# Patient Record
Sex: MALE | Race: WHITE
[De-identification: names, ages, dates, MRNs, and addresses within clinical notes are randomized per-mention and may not be internally consistent; named-entity substitution may affect disease eponyms.]

---

## 2022-07-08 ENCOUNTER — HOSPITAL ENCOUNTER (INPATIENT)
Dept: HOSPITAL 95 - ER | Age: 57
LOS: 3 days | Discharge: HOME | DRG: 871 | End: 2022-07-11
Attending: HOSPITALIST | Admitting: HOSPITALIST
Payer: MEDICARE

## 2022-07-08 VITALS — WEIGHT: 238.1 LBS | HEIGHT: 67 IN | BODY MASS INDEX: 37.37 KG/M2

## 2022-07-08 DIAGNOSIS — Z89.421: ICD-10-CM

## 2022-07-08 DIAGNOSIS — I25.10: ICD-10-CM

## 2022-07-08 DIAGNOSIS — Z99.81: ICD-10-CM

## 2022-07-08 DIAGNOSIS — Z88.8: ICD-10-CM

## 2022-07-08 DIAGNOSIS — F17.290: ICD-10-CM

## 2022-07-08 DIAGNOSIS — Z88.5: ICD-10-CM

## 2022-07-08 DIAGNOSIS — I25.2: ICD-10-CM

## 2022-07-08 DIAGNOSIS — R65.20: ICD-10-CM

## 2022-07-08 DIAGNOSIS — E66.01: ICD-10-CM

## 2022-07-08 DIAGNOSIS — D64.9: ICD-10-CM

## 2022-07-08 DIAGNOSIS — J96.01: ICD-10-CM

## 2022-07-08 DIAGNOSIS — J18.9: ICD-10-CM

## 2022-07-08 DIAGNOSIS — G47.33: ICD-10-CM

## 2022-07-08 DIAGNOSIS — I50.33: ICD-10-CM

## 2022-07-08 DIAGNOSIS — Z79.899: ICD-10-CM

## 2022-07-08 DIAGNOSIS — Z79.4: ICD-10-CM

## 2022-07-08 DIAGNOSIS — N40.0: ICD-10-CM

## 2022-07-08 DIAGNOSIS — G47.00: ICD-10-CM

## 2022-07-08 DIAGNOSIS — I11.0: ICD-10-CM

## 2022-07-08 DIAGNOSIS — E11.9: ICD-10-CM

## 2022-07-08 DIAGNOSIS — I21.A1: ICD-10-CM

## 2022-07-08 DIAGNOSIS — J44.0: ICD-10-CM

## 2022-07-08 DIAGNOSIS — Z86.16: ICD-10-CM

## 2022-07-08 DIAGNOSIS — Z95.1: ICD-10-CM

## 2022-07-08 DIAGNOSIS — N17.9: ICD-10-CM

## 2022-07-08 DIAGNOSIS — A41.9: Primary | ICD-10-CM

## 2022-07-08 DIAGNOSIS — L53.9: ICD-10-CM

## 2022-07-08 DIAGNOSIS — R77.8: ICD-10-CM

## 2022-07-08 LAB
ALBUMIN SERPL BCP-MCNC: 1.6 G/DL (ref 3.4–5)
ALBUMIN/GLOB SERPL: 0.3 {RATIO} (ref 0.8–1.8)
ALT SERPL W P-5'-P-CCNC: 19 U/L (ref 12–78)
ANION GAP SERPL CALCULATED.4IONS-SCNC: 9 MMOL/L (ref 6–16)
AST SERPL W P-5'-P-CCNC: 27 U/L (ref 12–37)
BASOPHILS # BLD AUTO: 0.03 K/MM3 (ref 0–0.23)
BASOPHILS NFR BLD AUTO: 0 % (ref 0–2)
BILIRUB SERPL-MCNC: 0.5 MG/DL (ref 0.1–1)
BUN SERPL-MCNC: 24 MG/DL (ref 8–24)
CALCIUM SERPL-MCNC: 8.4 MG/DL (ref 8.5–10.1)
CHLORIDE SERPL-SCNC: 101 MMOL/L (ref 98–108)
CO2 SERPL-SCNC: 25 MMOL/L (ref 21–32)
CREAT SERPL-MCNC: 1.72 MG/DL (ref 0.6–1.2)
DEPRECATED RDW RBC AUTO: 48.7 FL (ref 35.1–46.3)
EOSINOPHIL # BLD AUTO: 0 K/MM3 (ref 0–0.68)
EOSINOPHIL NFR BLD AUTO: 0 % (ref 0–6)
ERYTHROCYTE [DISTWIDTH] IN BLOOD BY AUTOMATED COUNT: 14.8 % (ref 11.7–14.2)
FLUAV RNA SPEC QL NAA+PROBE: NEGATIVE
FLUBV RNA SPEC QL NAA+PROBE: NEGATIVE
GLOBULIN SER CALC-MCNC: 4.9 G/DL (ref 2.2–4)
GLUCOSE SERPL-MCNC: 240 MG/DL (ref 70–99)
HCT VFR BLD AUTO: 34.1 % (ref 37–53)
HGB BLD-MCNC: 11.2 G/DL (ref 13.5–17.5)
IMM GRANULOCYTES # BLD AUTO: 0.31 K/MM3 (ref 0–0.1)
IMM GRANULOCYTES NFR BLD AUTO: 2 % (ref 0–1)
LYMPHOCYTES # BLD AUTO: 0.54 K/MM3 (ref 0.84–5.2)
LYMPHOCYTES NFR BLD AUTO: 3 % (ref 21–46)
MCHC RBC AUTO-ENTMCNC: 32.8 G/DL (ref 31.5–36.5)
MCV RBC AUTO: 90 FL (ref 80–100)
MONOCYTES # BLD AUTO: 0.31 K/MM3 (ref 0.16–1.47)
MONOCYTES NFR BLD AUTO: 2 % (ref 4–13)
NEUTROPHILS # BLD AUTO: 17.1 K/MM3 (ref 1.96–9.15)
NEUTROPHILS NFR BLD AUTO: 93 % (ref 41–73)
NRBC # BLD AUTO: 0 K/MM3 (ref 0–0.02)
NRBC BLD AUTO-RTO: 0 /100 WBC (ref 0–0.2)
PLATELET # BLD AUTO: 168 K/MM3 (ref 150–400)
POTASSIUM SERPL-SCNC: 3.7 MMOL/L (ref 3.5–5.5)
PROT SERPL-MCNC: 6.5 G/DL (ref 6.4–8.2)
RSV RNA SPEC QL NAA+PROBE: NEGATIVE
SARS-COV-2 RNA RESP QL NAA+PROBE: POSITIVE
SODIUM SERPL-SCNC: 135 MMOL/L (ref 136–145)

## 2022-07-08 PROCEDURE — P9046 ALBUMIN (HUMAN), 25%, 20 ML: HCPCS

## 2022-07-08 PROCEDURE — A9270 NON-COVERED ITEM OR SERVICE: HCPCS

## 2022-07-08 NOTE — NUR
PCU ADMIT / SHIFT SUMMARY
 
PT BROUGHT TO PCU-13 BY TEETEE FROM ER @ APPROX 1500. PT LETHARGIC, SLID OVER
FROM [facility] TO PCU BED BY 4 STAFF MEMBERS. PT REQUIRING FREQUENT REAWAKENING
TO COMPLETE ADMIT QUESTIONS/ASSESSMENT. PT MORE ALERT AFTER SLEEPING FOR
PERIOD. VSS. MONITOR SHOWING SR, HR 80's. SPO2 > 92% ON 2L NC WHICH IS PT's
REPORTED HOME BASELINE. PT DOES HOWEVER REPORT BEING HOMELESS AT THIS TIME,
STAYING [facility] TH[facility]." PT W/ BLE REDNESS.
ULCER NOTED TO R HEEL, SEE WOUND PHOTO IN CHART. WOUND CLEANSED & DRESSED. PT
UP TO BSC, UNSTEADY WHEN STANDING. PT REPORTING "I STILL HAVEN'T GOTTEN MY
BALANCE AFTER HAVING MY TOE AMPUTATED." PT W/ LOOSE BROWN/YELLOW STOOL X2,
MIXED W/ URINE IN COMMODE.

## 2022-07-09 LAB
ALBUMIN SERPL BCP-MCNC: 1.7 G/DL (ref 3.4–5)
ALBUMIN/GLOB SERPL: 0.4 {RATIO} (ref 0.8–1.8)
ALT SERPL W P-5'-P-CCNC: 15 U/L (ref 12–78)
ANION GAP SERPL CALCULATED.4IONS-SCNC: 6 MMOL/L (ref 6–16)
AST SERPL W P-5'-P-CCNC: 14 U/L (ref 12–37)
BASOPHILS # BLD AUTO: 0.02 K/MM3 (ref 0–0.23)
BASOPHILS NFR BLD AUTO: 0 % (ref 0–2)
BILIRUB SERPL-MCNC: 0.4 MG/DL (ref 0.1–1)
BUN SERPL-MCNC: 36 MG/DL (ref 8–24)
CALCIUM SERPL-MCNC: 8.1 MG/DL (ref 8.5–10.1)
CHLORIDE SERPL-SCNC: 99 MMOL/L (ref 98–108)
CO2 SERPL-SCNC: 29 MMOL/L (ref 21–32)
CREAT SERPL-MCNC: 2.32 MG/DL (ref 0.6–1.2)
DEPRECATED RDW RBC AUTO: 50.8 FL (ref 35.1–46.3)
EOSINOPHIL # BLD AUTO: 0 K/MM3 (ref 0–0.68)
EOSINOPHIL NFR BLD AUTO: 0 % (ref 0–6)
ERYTHROCYTE [DISTWIDTH] IN BLOOD BY AUTOMATED COUNT: 15.1 % (ref 11.7–14.2)
GLOBULIN SER CALC-MCNC: 4 G/DL (ref 2.2–4)
GLUCOSE SERPL-MCNC: 299 MG/DL (ref 70–99)
HCT VFR BLD AUTO: 27.9 % (ref 37–53)
HCT VFR BLD AUTO: 36.5 % (ref 37–53)
HGB BLD-MCNC: 11.6 G/DL (ref 13.5–17.5)
HGB BLD-MCNC: 8.8 G/DL (ref 13.5–17.5)
IMM GRANULOCYTES # BLD AUTO: 0.49 K/MM3 (ref 0–0.1)
IMM GRANULOCYTES NFR BLD AUTO: 3 % (ref 0–1)
LYMPHOCYTES # BLD AUTO: 0.8 K/MM3 (ref 0.84–5.2)
LYMPHOCYTES NFR BLD AUTO: 5 % (ref 21–46)
MCHC RBC AUTO-ENTMCNC: 31.5 G/DL (ref 31.5–36.5)
MCV RBC AUTO: 93 FL (ref 80–100)
MONOCYTES # BLD AUTO: 0.76 K/MM3 (ref 0.16–1.47)
MONOCYTES NFR BLD AUTO: 4 % (ref 4–13)
NEUTROPHILS # BLD AUTO: 15.42 K/MM3 (ref 1.96–9.15)
NEUTROPHILS NFR BLD AUTO: 88 % (ref 41–73)
NRBC # BLD AUTO: 0 K/MM3 (ref 0–0.02)
NRBC BLD AUTO-RTO: 0 /100 WBC (ref 0–0.2)
PLATELET # BLD AUTO: 140 K/MM3 (ref 150–400)
POTASSIUM SERPL-SCNC: 3.7 MMOL/L (ref 3.5–5.5)
PROT SERPL-MCNC: 5.7 G/DL (ref 6.4–8.2)
SODIUM SERPL-SCNC: 134 MMOL/L (ref 136–145)

## 2022-07-09 NOTE — NUR
SHIFT SUMMARY
PT A/O X4; PLEASANT AND COOPERATIVE WITH CARE. DENIES CP, DYSPNEA, OR PAIN. PT
ON LASIX AND VOIDING OFTEN. STAND BY ASSIST TO THE BSC. VSS. BED IN LOWEST
POSITION AND CALL LIGHT IN REACH.

## 2022-07-09 NOTE — NUR
SHIFT SUMMARY
 
NO ACUTE CHANGES TO REPORT OVERNIGHT, PT HAS SLEPT T/O SHIFT. HE DENIES
OVERNIGHT. CPAP SET UP FOR PT OVERNIGHT WITH 5L BLEED IN, PT IS TOLERATING
AND IS MAINTAINING SATS ABOVE 95%. IV ANTIBIOTICS CONTINUED PER ORDERS. NO
CHANGES ON TELE. BED IN LOWEST POSITION, CALL LIGHT WITHIN REACH.

## 2022-07-10 LAB
ANION GAP SERPL CALCULATED.4IONS-SCNC: 7 MMOL/L (ref 6–16)
BASOPHILS # BLD AUTO: 0.02 K/MM3 (ref 0–0.23)
BASOPHILS NFR BLD AUTO: 0 % (ref 0–2)
BUN SERPL-MCNC: 46 MG/DL (ref 8–24)
CALCIUM SERPL-MCNC: 8.2 MG/DL (ref 8.5–10.1)
CHLORIDE SERPL-SCNC: 100 MMOL/L (ref 98–108)
CO2 SERPL-SCNC: 28 MMOL/L (ref 21–32)
CREAT SERPL-MCNC: 1.9 MG/DL (ref 0.6–1.2)
DEPRECATED RDW RBC AUTO: 49.2 FL (ref 35.1–46.3)
EOSINOPHIL # BLD AUTO: 0.03 K/MM3 (ref 0–0.68)
EOSINOPHIL NFR BLD AUTO: 0 % (ref 0–6)
ERYTHROCYTE [DISTWIDTH] IN BLOOD BY AUTOMATED COUNT: 14.8 % (ref 11.7–14.2)
GLUCOSE SERPL-MCNC: 192 MG/DL (ref 70–99)
HCT VFR BLD AUTO: 31.4 % (ref 37–53)
HGB BLD-MCNC: 10.2 G/DL (ref 13.5–17.5)
IMM GRANULOCYTES # BLD AUTO: 0.04 K/MM3 (ref 0–0.1)
IMM GRANULOCYTES NFR BLD AUTO: 0 % (ref 0–1)
LYMPHOCYTES # BLD AUTO: 1.07 K/MM3 (ref 0.84–5.2)
LYMPHOCYTES NFR BLD AUTO: 12 % (ref 21–46)
MCHC RBC AUTO-ENTMCNC: 32.5 G/DL (ref 31.5–36.5)
MCV RBC AUTO: 91 FL (ref 80–100)
MONOCYTES # BLD AUTO: 0.52 K/MM3 (ref 0.16–1.47)
MONOCYTES NFR BLD AUTO: 6 % (ref 4–13)
NEUTROPHILS # BLD AUTO: 7.6 K/MM3 (ref 1.96–9.15)
NEUTROPHILS NFR BLD AUTO: 82 % (ref 41–73)
NRBC # BLD AUTO: 0 K/MM3 (ref 0–0.02)
NRBC BLD AUTO-RTO: 0 /100 WBC (ref 0–0.2)
PLATELET # BLD AUTO: 128 K/MM3 (ref 150–400)
POTASSIUM SERPL-SCNC: 3.4 MMOL/L (ref 3.5–5.5)
SODIUM SERPL-SCNC: 135 MMOL/L (ref 136–145)

## 2022-07-10 NOTE — NUR
PT HAS BEEN STABLE THIS SHIFT. BLOOD SUGARS STABLE WITH COVERAGE AS ORDERED
WITH MEALS. PT HAS BEEN ON RA THROUGHOUT DAY WHILE AWAKE. PT PLACED ON 1-2L
WITH SLEEP AS HE DESATS INTO 70'S WITH SLEEP. CPAP NONCOMPLIANT. PT VOIDING
WELL WITH BSC. INDEP TO TRANSFER BUT SBA TO AMBULATE. HEEL ULCER REDRESSED
AFTER SHOWER THIS AFTERNOON, PHOTO ON CHART. TELE SR WITH BBB. PT REFUSING
HEPARIN AND PAS. PLAN FOR DC BACK TO [facility] WHEN APPRORIATE.

## 2022-07-10 NOTE — NUR
SUMMARY
PT WITH NO ACUTE CHANGES TONIGHT.ABLE TO INDEPENDANTLY GET TO BSC AND FOR BRP.
PT USES CANE BASELINE.REPORTS MINIMAL SOB WITH ACTIVITY.

## 2022-07-10 NOTE — NUR
OXYGEN:
ABLE TO WEAN PT TO RA WHEN AWAKE AND SITTING UP. PT LAYING DOWN TO SLEEP AND
SATS DIP DOWN INTO THE 70'S. PLACED BACK ON 3L. WILL CONTINUE TO WEAN AS ABLE.

## 2022-07-11 LAB
ANION GAP SERPL CALCULATED.4IONS-SCNC: 4 MMOL/L (ref 6–16)
BASOPHILS # BLD AUTO: 0.03 K/MM3 (ref 0–0.23)
BASOPHILS NFR BLD AUTO: 1 % (ref 0–2)
BUN SERPL-MCNC: 44 MG/DL (ref 8–24)
CALCIUM SERPL-MCNC: 8.4 MG/DL (ref 8.5–10.1)
CHLORIDE SERPL-SCNC: 108 MMOL/L (ref 98–108)
CO2 SERPL-SCNC: 32 MMOL/L (ref 21–32)
CREAT SERPL-MCNC: 1.45 MG/DL (ref 0.6–1.2)
DEPRECATED RDW RBC AUTO: 51.5 FL (ref 35.1–46.3)
EOSINOPHIL # BLD AUTO: 0.07 K/MM3 (ref 0–0.68)
EOSINOPHIL NFR BLD AUTO: 2 % (ref 0–6)
ERYTHROCYTE [DISTWIDTH] IN BLOOD BY AUTOMATED COUNT: 15 % (ref 11.7–14.2)
GLUCOSE SERPL-MCNC: 163 MG/DL (ref 70–99)
HCT VFR BLD AUTO: 32.4 % (ref 37–53)
HGB BLD-MCNC: 10 G/DL (ref 13.5–17.5)
IMM GRANULOCYTES # BLD AUTO: 0.01 K/MM3 (ref 0–0.1)
IMM GRANULOCYTES NFR BLD AUTO: 0 % (ref 0–1)
LYMPHOCYTES # BLD AUTO: 1.15 K/MM3 (ref 0.84–5.2)
LYMPHOCYTES NFR BLD AUTO: 24 % (ref 21–46)
MCHC RBC AUTO-ENTMCNC: 30.9 G/DL (ref 31.5–36.5)
MCV RBC AUTO: 94 FL (ref 80–100)
MONOCYTES # BLD AUTO: 0.41 K/MM3 (ref 0.16–1.47)
MONOCYTES NFR BLD AUTO: 9 % (ref 4–13)
NEUTROPHILS # BLD AUTO: 3.12 K/MM3 (ref 1.96–9.15)
NEUTROPHILS NFR BLD AUTO: 65 % (ref 41–73)
NRBC # BLD AUTO: 0 K/MM3 (ref 0–0.02)
NRBC BLD AUTO-RTO: 0 /100 WBC (ref 0–0.2)
PLATELET # BLD AUTO: 128 K/MM3 (ref 150–400)
POTASSIUM SERPL-SCNC: 3.5 MMOL/L (ref 3.5–5.5)
SODIUM SERPL-SCNC: 144 MMOL/L (ref 136–145)

## 2022-07-11 NOTE — NUR
DISCHARGE EDUCATION
DISCUSSED INSTRUCTIONS. PT FEELS COMFORTABLE w/ INFO. MEDS CALLED TO [facility]. WAITING FOR O2 DELIVERY.

## 2022-07-11 NOTE — NUR
NIGHT SHIFT SUMMARY
PT IS ALERT AND COMMUNICATING APPROPRIATELY. PT MAINTAINED O2 SATS >90% ON 2L
NC EXCEPT FOR TWO EPISODES WHERE THE PT'S O2 SATS DROPPED RAPIDLY INTO THE
70'S REQUIRING INCREASED O2 TO CATCH UP. BP HAND HR WNL AND STABLE THIS SHIFT.
PT AFEBRILE. PT WAS ABLE TO SLEEP COMFORTABLY FOR MOST OF THE SHIFT WEARING
HIS CPAP W 3L BLEED-IN. WILL REPORT TO ONCOMING RN.

## 2022-07-26 ENCOUNTER — HOSPITAL ENCOUNTER (EMERGENCY)
Dept: HOSPITAL 95 - ER | Age: 57
Discharge: HOME | End: 2022-07-26
Payer: MEDICARE

## 2022-07-26 VITALS — BODY MASS INDEX: 33.34 KG/M2 | WEIGHT: 220 LBS | HEIGHT: 68 IN

## 2022-07-26 DIAGNOSIS — I25.2: ICD-10-CM

## 2022-07-26 DIAGNOSIS — Z59.02: ICD-10-CM

## 2022-07-26 DIAGNOSIS — Z79.899: ICD-10-CM

## 2022-07-26 DIAGNOSIS — J44.9: ICD-10-CM

## 2022-07-26 DIAGNOSIS — Z91.14: ICD-10-CM

## 2022-07-26 DIAGNOSIS — T67.5XXA: Primary | ICD-10-CM

## 2022-07-26 DIAGNOSIS — I50.9: ICD-10-CM
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
## 2022-07-26 DIAGNOSIS — Z88.5: ICD-10-CM

## 2022-07-26 DIAGNOSIS — Z88.6: ICD-10-CM

## 2022-07-26 DIAGNOSIS — E11.9: ICD-10-CM

## 2022-07-26 LAB
ALBUMIN SERPL BCP-MCNC: 1.4 G/DL (ref 3.4–5)
ALBUMIN/GLOB SERPL: 0.3 {RATIO} (ref 0.8–1.8)
ALT SERPL W P-5'-P-CCNC: 14 U/L (ref 12–78)
ANION GAP SERPL CALCULATED.4IONS-SCNC: 4 MMOL/L (ref 6–16)
AST SERPL W P-5'-P-CCNC: 15 U/L (ref 12–37)
BASOPHILS # BLD AUTO: 0.05 K/MM3 (ref 0–0.23)
BASOPHILS NFR BLD AUTO: 1 % (ref 0–2)
BILIRUB SERPL-MCNC: 0.2 MG/DL (ref 0.1–1)
BUN SERPL-MCNC: 12 MG/DL (ref 8–24)
CALCIUM SERPL-MCNC: 8.2 MG/DL (ref 8.5–10.1)
CHLORIDE SERPL-SCNC: 108 MMOL/L (ref 98–108)
CO2 SERPL-SCNC: 31 MMOL/L (ref 21–32)
CREAT SERPL-MCNC: 0.97 MG/DL (ref 0.6–1.2)
DEPRECATED RDW RBC AUTO: 45.4 FL (ref 35.1–46.3)
EOSINOPHIL # BLD AUTO: 0.09 K/MM3 (ref 0–0.68)
EOSINOPHIL NFR BLD AUTO: 1 % (ref 0–6)
ERYTHROCYTE [DISTWIDTH] IN BLOOD BY AUTOMATED COUNT: 13.8 % (ref 11.7–14.2)
GLOBULIN SER CALC-MCNC: 4.9 G/DL (ref 2.2–4)
GLUCOSE SERPL-MCNC: 202 MG/DL (ref 70–99)
HCT VFR BLD AUTO: 34.1 % (ref 37–53)
HGB BLD-MCNC: 11 G/DL (ref 13.5–17.5)
IMM GRANULOCYTES # BLD AUTO: 0.01 K/MM3 (ref 0–0.1)
IMM GRANULOCYTES NFR BLD AUTO: 0 % (ref 0–1)
LYMPHOCYTES # BLD AUTO: 1.59 K/MM3 (ref 0.84–5.2)
LYMPHOCYTES NFR BLD AUTO: 21 % (ref 21–46)
MAGNESIUM SERPL-MCNC: 2 MG/DL (ref 1.6–2.4)
MCHC RBC AUTO-ENTMCNC: 32.3 G/DL (ref 31.5–36.5)
MCV RBC AUTO: 89 FL (ref 80–100)
MONOCYTES # BLD AUTO: 0.56 K/MM3 (ref 0.16–1.47)
MONOCYTES NFR BLD AUTO: 7 % (ref 4–13)
NEUTROPHILS # BLD AUTO: 5.39 K/MM3 (ref 1.96–9.15)
NEUTROPHILS NFR BLD AUTO: 70 % (ref 41–73)
NRBC # BLD AUTO: 0 K/MM3 (ref 0–0.02)
NRBC BLD AUTO-RTO: 0 /100 WBC (ref 0–0.2)
PLATELET # BLD AUTO: 252 K/MM3 (ref 150–400)
POTASSIUM SERPL-SCNC: 3.3 MMOL/L (ref 3.5–5.5)
PROT SERPL-MCNC: 6.3 G/DL (ref 6.4–8.2)
SODIUM SERPL-SCNC: 143 MMOL/L (ref 136–145)

## 2022-07-26 SDOH — ECONOMIC STABILITY - HOUSING INSECURITY: UNSHELTERED HOMELESSNESS: Z59.02

## 2022-10-08 ENCOUNTER — HOSPITAL ENCOUNTER (INPATIENT)
Dept: HOSPITAL 95 - ER | Age: 57
LOS: 5 days | Discharge: HOME | DRG: 872 | End: 2022-10-13
Attending: HOSPITALIST | Admitting: HOSPITALIST
Payer: MEDICARE

## 2022-10-08 VITALS — BODY MASS INDEX: 35.61 KG/M2 | HEIGHT: 67 IN | WEIGHT: 226.86 LBS

## 2022-10-08 DIAGNOSIS — Z79.82: ICD-10-CM

## 2022-10-08 DIAGNOSIS — Z88.5: ICD-10-CM

## 2022-10-08 DIAGNOSIS — E11.40: ICD-10-CM

## 2022-10-08 DIAGNOSIS — Z79.4: ICD-10-CM

## 2022-10-08 DIAGNOSIS — I50.32: ICD-10-CM

## 2022-10-08 DIAGNOSIS — E66.9: ICD-10-CM

## 2022-10-08 DIAGNOSIS — I25.10: ICD-10-CM

## 2022-10-08 DIAGNOSIS — Z88.6: ICD-10-CM

## 2022-10-08 DIAGNOSIS — N40.0: ICD-10-CM

## 2022-10-08 DIAGNOSIS — Z95.1: ICD-10-CM

## 2022-10-08 DIAGNOSIS — I11.0: ICD-10-CM

## 2022-10-08 DIAGNOSIS — Z88.8: ICD-10-CM

## 2022-10-08 DIAGNOSIS — Z89.421: ICD-10-CM

## 2022-10-08 DIAGNOSIS — L03.115: ICD-10-CM

## 2022-10-08 DIAGNOSIS — L97.419: ICD-10-CM

## 2022-10-08 DIAGNOSIS — Z79.899: ICD-10-CM

## 2022-10-08 DIAGNOSIS — G47.33: ICD-10-CM

## 2022-10-08 DIAGNOSIS — J44.9: ICD-10-CM

## 2022-10-08 DIAGNOSIS — A41.9: Primary | ICD-10-CM

## 2022-10-08 LAB
ALBUMIN SERPL BCP-MCNC: 1.7 G/DL (ref 3.4–5)
ALBUMIN/GLOB SERPL: 0.3 {RATIO} (ref 0.8–1.8)
ALT SERPL W P-5'-P-CCNC: 23 U/L (ref 12–78)
ANION GAP SERPL CALCULATED.4IONS-SCNC: 4 MMOL/L (ref 6–16)
AST SERPL W P-5'-P-CCNC: 33 U/L (ref 12–37)
BASOPHILS # BLD AUTO: 0.04 K/MM3 (ref 0–0.23)
BASOPHILS NFR BLD AUTO: 0 % (ref 0–2)
BILIRUB SERPL-MCNC: 0.6 MG/DL (ref 0.1–1)
BUN SERPL-MCNC: 26 MG/DL (ref 8–24)
CALCIUM SERPL-MCNC: 8.3 MG/DL (ref 8.5–10.1)
CHLORIDE SERPL-SCNC: 100 MMOL/L (ref 98–108)
CO2 SERPL-SCNC: 29 MMOL/L (ref 21–32)
CREAT SERPL-MCNC: 1.42 MG/DL (ref 0.6–1.2)
DEPRECATED RDW RBC AUTO: 50.4 FL (ref 35.1–46.3)
EOSINOPHIL # BLD AUTO: 0.01 K/MM3 (ref 0–0.68)
EOSINOPHIL NFR BLD AUTO: 0 % (ref 0–6)
ERYTHROCYTE [DISTWIDTH] IN BLOOD BY AUTOMATED COUNT: 15.5 % (ref 11.7–14.2)
GLOBULIN SER CALC-MCNC: 5.7 G/DL (ref 2.2–4)
GLUCOSE SERPL-MCNC: 147 MG/DL (ref 70–99)
HCT VFR BLD AUTO: 36 % (ref 37–53)
HGB BLD-MCNC: 11.6 G/DL (ref 13.5–17.5)
IMM GRANULOCYTES # BLD AUTO: 0.04 K/MM3 (ref 0–0.1)
IMM GRANULOCYTES NFR BLD AUTO: 0 % (ref 0–1)
LYMPHOCYTES # BLD AUTO: 0.77 K/MM3 (ref 0.84–5.2)
LYMPHOCYTES NFR BLD AUTO: 7 % (ref 21–46)
MCHC RBC AUTO-ENTMCNC: 32.2 G/DL (ref 31.5–36.5)
MCV RBC AUTO: 89 FL (ref 80–100)
MONOCYTES # BLD AUTO: 0.52 K/MM3 (ref 0.16–1.47)
MONOCYTES NFR BLD AUTO: 5 % (ref 4–13)
NEUTROPHILS # BLD AUTO: 10.07 K/MM3 (ref 1.96–9.15)
NEUTROPHILS NFR BLD AUTO: 88 % (ref 41–73)
NRBC # BLD AUTO: 0 K/MM3 (ref 0–0.02)
NRBC BLD AUTO-RTO: 0 /100 WBC (ref 0–0.2)
PLATELET # BLD AUTO: 189 K/MM3 (ref 150–400)
POTASSIUM SERPL-SCNC: 3.6 MMOL/L (ref 3.5–5.5)
PROT SERPL-MCNC: 7.4 G/DL (ref 6.4–8.2)
SODIUM SERPL-SCNC: 133 MMOL/L (ref 136–145)

## 2022-10-08 PROCEDURE — A9270 NON-COVERED ITEM OR SERVICE: HCPCS

## 2022-10-08 NOTE — NUR
SHIFT SUMMARY:
PATIENT ADMITTED TO MEDICAL UNIT FROM ED WITH THE DX OF CELLULITIS. PATIENT
ARRIVED TO ROOM 355 AT AROUND 1150 VIA GURNEY AND TRANSFERRED TO BED WITH SBA
USING A CANE. PATIENT A&OX4. PLEASANT & COOPERATIVE WITH CARE. ON TELE, NSR AT
85 BPM PER TELE TECH. DENIES CP/CHEST DISCOMFORT. USES O2 2L VIA NC. LUNGS
WHEEZES T/O. BLE CELLULITIS. RLE WITH OUTLINE MARKING. R HEEL DIABETIC ULCER
SEE PICTURE ON CHART. WOUNDCARE CONSULT WAS PLACED. PATIENT HAS BEEN
AMBULATING TO BATHROOM USING A CANE WITH SBA. USES CALL LIGHT APPROPRIATELY.
BED IN LOWEST POSITION, LOCKED AND CALL LIGHT IN REACH.

## 2022-10-09 LAB
ALBUMIN SERPL BCP-MCNC: 1.4 G/DL (ref 3.4–5)
ALBUMIN/GLOB SERPL: 0.3 {RATIO} (ref 0.8–1.8)
ALT SERPL W P-5'-P-CCNC: 19 U/L (ref 12–78)
ANION GAP SERPL CALCULATED.4IONS-SCNC: 4 MMOL/L (ref 6–16)
AST SERPL W P-5'-P-CCNC: 22 U/L (ref 12–37)
BASOPHILS # BLD AUTO: 0.04 K/MM3 (ref 0–0.23)
BASOPHILS NFR BLD AUTO: 1 % (ref 0–2)
BILIRUB SERPL-MCNC: 0.3 MG/DL (ref 0.1–1)
BUN SERPL-MCNC: 31 MG/DL (ref 8–24)
CALCIUM SERPL-MCNC: 8.2 MG/DL (ref 8.5–10.1)
CHLORIDE SERPL-SCNC: 105 MMOL/L (ref 98–108)
CO2 SERPL-SCNC: 32 MMOL/L (ref 21–32)
CREAT SERPL-MCNC: 1.34 MG/DL (ref 0.6–1.2)
DEPRECATED RDW RBC AUTO: 50.8 FL (ref 35.1–46.3)
EOSINOPHIL # BLD AUTO: 0.14 K/MM3 (ref 0–0.68)
EOSINOPHIL NFR BLD AUTO: 2 % (ref 0–6)
ERYTHROCYTE [DISTWIDTH] IN BLOOD BY AUTOMATED COUNT: 15.4 % (ref 11.7–14.2)
GLOBULIN SER CALC-MCNC: 5 G/DL (ref 2.2–4)
GLUCOSE SERPL-MCNC: 59 MG/DL (ref 70–99)
HCT VFR BLD AUTO: 33.4 % (ref 37–53)
HGB BLD-MCNC: 10.5 G/DL (ref 13.5–17.5)
IMM GRANULOCYTES # BLD AUTO: 0.01 K/MM3 (ref 0–0.1)
IMM GRANULOCYTES NFR BLD AUTO: 0 % (ref 0–1)
LYMPHOCYTES # BLD AUTO: 1.93 K/MM3 (ref 0.84–5.2)
LYMPHOCYTES NFR BLD AUTO: 29 % (ref 21–46)
MAGNESIUM SERPL-MCNC: 2.2 MG/DL (ref 1.6–2.4)
MCHC RBC AUTO-ENTMCNC: 31.4 G/DL (ref 31.5–36.5)
MCV RBC AUTO: 90 FL (ref 80–100)
MONOCYTES # BLD AUTO: 0.62 K/MM3 (ref 0.16–1.47)
MONOCYTES NFR BLD AUTO: 9 % (ref 4–13)
NEUTROPHILS # BLD AUTO: 3.83 K/MM3 (ref 1.96–9.15)
NEUTROPHILS NFR BLD AUTO: 58 % (ref 41–73)
NRBC # BLD AUTO: 0 K/MM3 (ref 0–0.02)
NRBC BLD AUTO-RTO: 0 /100 WBC (ref 0–0.2)
PLATELET # BLD AUTO: 178 K/MM3 (ref 150–400)
POTASSIUM SERPL-SCNC: 3.5 MMOL/L (ref 3.5–5.5)
PROT SERPL-MCNC: 6.4 G/DL (ref 6.4–8.2)
SODIUM SERPL-SCNC: 141 MMOL/L (ref 136–145)

## 2022-10-09 NOTE — NUR
[facility] SHIFT SUMMARY
NO ACUTE CHANGES. PT SLEPT T/O THE NIGHT. UNABLE TO DRAW LABS F/POWER GLIDE.
LAB NOTIFIED. PT CONTINUES ON 2L O2 NC. PT DENIES PAIN. ON TELE; NORMAL SINUS
77BPM. PT IS PLEASANT AND COOPERATIVE W/CARE. ABLE TO ADVOCATED FOR NEEDS.
CALL LIGHT IN REACH.

## 2022-10-09 NOTE — NUR
SHIFT SUMMARY:
PATIENT A&OX4. PLEASANT AND COOPERATIVE WITH CARE. USES CALL LIGHT
APPROPRIATELY AND ABLE TO ADVOCATE FOR HIS NEEDS. USES O2 2L VIA NC WITH SPO2
ABOVE 95%. LUNGS HAS SOME COARSE AND WHEEZES T/O. PATIENT HAS BEEN HAVING
NONPRODUCTIVE COUGH. RECIEVED ONE DOSE PRN TESSALON FOR COUGH PER EMAR.
PATIENT HAD A SHOWER THIS PM AND TOLERATED WELL. DRESSING CHANGED TO R HEEL
WITH MIPELIX HEEL PROTECTOR DRESSING. AMBULATES TO BATHROOM INDEPENDENTLY.
VITAL SIGNS REVIEWED. BED IN LOWEST POSITION, LOCKED AND CALL LIGHT IN REACH.

## 2022-10-10 NOTE — NUR
NO ACUTE CHANGES.
HS CBG ; PT REFUSED LONG ACTING INSULIN COVERAGE OF 30 UNITS; OFFERED
TO ADJUST DOSE DOWN AND PT DECLINED. PT FREQUENTLY SITS AT BED SIDE AND
LEANS ON BEDSIDE TABLE; PT DENIES SOB AND SATURATIONS >90%. PT ON 3L O2
BASELINE. PT REFUSED MORNING LOVENOX ON 10/9/22 AND STATED TO THIS RN HE WILL
NOT TAKE A BLOOD THINNER. DRESSING ON RIGHT HEEL CDI. PT REPORTED HE HAS PTSD
AND REQUESTS THAT ALL LIGHTS REMAIN ON T/O THE NIGHT. PT SHOWERED THIS AM.
ABLE TO ADVOCATE FOR NEEDS. CALL LIGHT IN REACH.

## 2022-10-10 NOTE — NUR
SHIFT SUMMARY
PT AXO, PLEASANT AND COOPERATIVE WITH CARE. VSS. DRESSING CDI UNTIL WOUND CARE
CONSULT, SEE NOTE. DRESSING CDI AT THIS TIME. PT DENIES PAIN, NV AND SOB. BED
IN LOW POSITION, CALL LIGHT WITHIN REACH. PT UP AD DIXON IN ROOM. DR KAPOOR
NOTIFIED OF G TREDNING UP, NEW ORDERS IN PLACE.

## 2022-10-11 LAB
ANION GAP SERPL CALCULATED.4IONS-SCNC: 3 MMOL/L (ref 6–16)
BASOPHILS # BLD AUTO: 0.03 K/MM3 (ref 0–0.23)
BASOPHILS NFR BLD AUTO: 1 % (ref 0–2)
BUN SERPL-MCNC: 40 MG/DL (ref 8–24)
CALCIUM SERPL-MCNC: 8.8 MG/DL (ref 8.5–10.1)
CHLORIDE SERPL-SCNC: 104 MMOL/L (ref 98–108)
CO2 SERPL-SCNC: 34 MMOL/L (ref 21–32)
CREAT SERPL-MCNC: 1.21 MG/DL (ref 0.6–1.2)
DEPRECATED RDW RBC AUTO: 49.1 FL (ref 35.1–46.3)
EOSINOPHIL # BLD AUTO: 0.16 K/MM3 (ref 0–0.68)
EOSINOPHIL NFR BLD AUTO: 3 % (ref 0–6)
ERYTHROCYTE [DISTWIDTH] IN BLOOD BY AUTOMATED COUNT: 15 % (ref 11.7–14.2)
GLUCOSE SERPL-MCNC: 124 MG/DL (ref 70–99)
HCT VFR BLD AUTO: 34.2 % (ref 37–53)
HGB BLD-MCNC: 10.8 G/DL (ref 13.5–17.5)
IMM GRANULOCYTES # BLD AUTO: 0.02 K/MM3 (ref 0–0.1)
IMM GRANULOCYTES NFR BLD AUTO: 0 % (ref 0–1)
LYMPHOCYTES # BLD AUTO: 2.07 K/MM3 (ref 0.84–5.2)
LYMPHOCYTES NFR BLD AUTO: 34 % (ref 21–46)
MCHC RBC AUTO-ENTMCNC: 31.6 G/DL (ref 31.5–36.5)
MCV RBC AUTO: 90 FL (ref 80–100)
MONOCYTES # BLD AUTO: 0.55 K/MM3 (ref 0.16–1.47)
MONOCYTES NFR BLD AUTO: 9 % (ref 4–13)
NEUTROPHILS # BLD AUTO: 3.34 K/MM3 (ref 1.96–9.15)
NEUTROPHILS NFR BLD AUTO: 54 % (ref 41–73)
NRBC # BLD AUTO: 0 K/MM3 (ref 0–0.02)
NRBC BLD AUTO-RTO: 0 /100 WBC (ref 0–0.2)
PLATELET # BLD AUTO: 234 K/MM3 (ref 150–400)
POTASSIUM SERPL-SCNC: 4.2 MMOL/L (ref 3.5–5.5)
SODIUM SERPL-SCNC: 141 MMOL/L (ref 136–145)

## 2022-10-11 NOTE — NUR
NIGHT SHIFT SUMMARY
NO ACUTE CHANGES. PT IS PLEASANT AND COOPERATIVE WITH CARE. PT MENTIONED
NEEDING MORE OXYGEN F/LINCARE WHEN HE RETURNS HOME ([facility] L[facility]). PT SHOWERED IN THE MORNING AND WOUND REDRESSED. PT CONTINUING TO
COUGH-NONPRODUCTIVE BUT HARSH. PT INDEPENDENT IN THE ROOM. CALLS
APPROPRIATELY; CALL LIGHT IN REACH.

## 2022-10-11 NOTE — NUR
PATIENT MOOD IS EXTROVERTED AND COMICAL. LOVES TO TALK. PATIENT HAS A COUGH
AND WAS GIVEN PRN ROBITUSSIN. STATES IT STARTS AS A TICKLE IN HIS THROAT.
WOUND CARE WAS COMPLETED EARLY THIS AM, BY NOC SHIFT. DRESSINGS ARE CLEAN DRY
AND INTACT.
BLOOD SUGARS HAVE BEEN SLIGHTLY HIGH TO NORMAL. SOME SS COVERAGE WAS USED.
LS HAVE FINE CRACKLES MORE OBVIOUS IN THE MID TO BASE. HRR AND STRONG. EDEMA
PRESENT.

## 2022-10-12 NOTE — NUR
SHIFT SUMMARY:
PT A/O X 4 IND IN ROOM. PT AMBULATED HALLS LAST NIGHT WITH WALKER AND WAS
STEADY ON HIS FEET. PT TOOK A SHOWER AND DRESSING CHANGE WAS COMPLETED TO R
HEEL. WOUND HAD NO DRAINAGE OR ODOR. BS MANAGED. PT REMAINS ON 2 LPM VIA NC.
CONTINUES TO HAVE HARSH COUGH. ROBITUSSIN GIVEN FOR COUGH AND CONTROLLED WELL.
PT CONTINUES TO HAVE FINE CRACKLES IN BASES. PLEASANT AND COOPERATIVE WITH
CARES.

## 2022-10-12 NOTE — NUR
SHIFT SUMMARY
PTN A&O X4, INDEPENDENT IN THE ROOM, WOUND TO RIGHT HEEL AND BILATERAL LEG
CELLULITIS. POWER GLUDE TO RT UPPER EXTREMITY. 2L 02. CBGS AC/HS, IN FAIRLY
GOOD CONTROL THIS SHIFT. LOWER EXTREMITIES WITH EDEMA, INDELIBLE INK MARKING
AREA OF REDNESS, NOTING REDDENED AREA IMPROVED. CONTINUE TO MONITOR.

## 2022-10-13 NOTE — NUR
SHIFT SUMMARY
PTN DC'D BACK TO [facility] WHERE HE RESIDES AFTER LUNCH. WOUND TO R HEEL WAS
CLEANED, TREATED PER ORDERS, AND DRESSED. PICTURE TAKEN FOR DC. DC PAPERWORK
REVIEWED, MEDICATIONS CALLED TO PHARMACY OF CHOICE, AND FOLLOW-UP DISCUSSED.
PTN VOICED UNDERSTANDING OF SAME. PTN ESCORTED BY WHEELCHAIR TO [facility]
WHERE HIS VEHICLE WAS PARKED.

## 2022-10-13 NOTE — NUR
SHIFT SUMMARY:
PT A/O X 4 IND IN ROOM. DRESSING CHANGE COMPLETED TO R HEEL. NO DRAINAGE OR
EXUDATE NOTED. PT EATING AND DRINKING WELL. BS WELL CONTROLLED. NO CONCERNS
FROM PT THROUGH THE NIGHT. COUGH CONTINUES. PLEASANT AND COOPERATIVE.

## 2022-10-26 ENCOUNTER — HOSPITAL ENCOUNTER (INPATIENT)
Dept: HOSPITAL 95 - ER | Age: 57
LOS: 21 days | Discharge: NURSING, REHAB OR LONG-TERM CARE | DRG: 871 | End: 2022-11-16
Attending: HOSPITALIST | Admitting: INTERNAL MEDICINE
Payer: MEDICARE

## 2022-10-26 VITALS — WEIGHT: 211.64 LBS | BODY MASS INDEX: 32.08 KG/M2 | HEIGHT: 68 IN

## 2022-10-26 DIAGNOSIS — Z88.8: ICD-10-CM

## 2022-10-26 DIAGNOSIS — I25.10: ICD-10-CM

## 2022-10-26 DIAGNOSIS — J44.9: ICD-10-CM

## 2022-10-26 DIAGNOSIS — E66.9: ICD-10-CM

## 2022-10-26 DIAGNOSIS — N18.9: ICD-10-CM

## 2022-10-26 DIAGNOSIS — G47.33: ICD-10-CM

## 2022-10-26 DIAGNOSIS — Z95.1: ICD-10-CM

## 2022-10-26 DIAGNOSIS — Z88.6: ICD-10-CM

## 2022-10-26 DIAGNOSIS — I33.0: ICD-10-CM

## 2022-10-26 DIAGNOSIS — Z79.4: ICD-10-CM

## 2022-10-26 DIAGNOSIS — L03.115: ICD-10-CM

## 2022-10-26 DIAGNOSIS — I16.1: ICD-10-CM

## 2022-10-26 DIAGNOSIS — E11.621: ICD-10-CM

## 2022-10-26 DIAGNOSIS — F17.210: ICD-10-CM

## 2022-10-26 DIAGNOSIS — Z20.822: ICD-10-CM

## 2022-10-26 DIAGNOSIS — E11.22: ICD-10-CM

## 2022-10-26 DIAGNOSIS — Z79.899: ICD-10-CM

## 2022-10-26 DIAGNOSIS — L03.116: ICD-10-CM

## 2022-10-26 DIAGNOSIS — N40.0: ICD-10-CM

## 2022-10-26 DIAGNOSIS — L97.419: ICD-10-CM

## 2022-10-26 DIAGNOSIS — I50.32: ICD-10-CM

## 2022-10-26 DIAGNOSIS — A40.8: Primary | ICD-10-CM

## 2022-10-26 DIAGNOSIS — E11.51: ICD-10-CM

## 2022-10-26 DIAGNOSIS — I13.0: ICD-10-CM

## 2022-10-26 DIAGNOSIS — E11.40: ICD-10-CM

## 2022-10-26 DIAGNOSIS — Z79.82: ICD-10-CM

## 2022-10-26 DIAGNOSIS — Z89.421: ICD-10-CM

## 2022-10-26 LAB
ALBUMIN SERPL BCP-MCNC: 2 G/DL (ref 3.4–5)
ALBUMIN/GLOB SERPL: 0.3 {RATIO} (ref 0.8–1.8)
ALT SERPL W P-5'-P-CCNC: 26 U/L (ref 12–78)
ANION GAP SERPL CALCULATED.4IONS-SCNC: 7 MMOL/L (ref 6–16)
AST SERPL W P-5'-P-CCNC: 28 U/L (ref 12–37)
BASOPHILS # BLD AUTO: 0.04 K/MM3 (ref 0–0.23)
BASOPHILS NFR BLD AUTO: 0 % (ref 0–2)
BILIRUB SERPL-MCNC: 0.3 MG/DL (ref 0.1–1)
BUN SERPL-MCNC: 30 MG/DL (ref 8–24)
CALCIUM SERPL-MCNC: 8.6 MG/DL (ref 8.5–10.1)
CHLORIDE SERPL-SCNC: 105 MMOL/L (ref 98–108)
CO2 SERPL-SCNC: 27 MMOL/L (ref 21–32)
CREAT SERPL-MCNC: 1.06 MG/DL (ref 0.6–1.2)
DEPRECATED RDW RBC AUTO: 45.1 FL (ref 35.1–46.3)
EOSINOPHIL # BLD AUTO: 0.08 K/MM3 (ref 0–0.68)
EOSINOPHIL NFR BLD AUTO: 1 % (ref 0–6)
ERYTHROCYTE [DISTWIDTH] IN BLOOD BY AUTOMATED COUNT: 14.4 % (ref 11.7–14.2)
FLUAV RNA SPEC QL NAA+PROBE: NEGATIVE
FLUBV RNA SPEC QL NAA+PROBE: NEGATIVE
GLOBULIN SER CALC-MCNC: 6.2 G/DL (ref 2.2–4)
GLUCOSE SERPL-MCNC: 247 MG/DL (ref 70–99)
HCT VFR BLD AUTO: 38.5 % (ref 37–53)
HGB BLD-MCNC: 12.7 G/DL (ref 13.5–17.5)
IMM GRANULOCYTES # BLD AUTO: 0.05 K/MM3 (ref 0–0.1)
IMM GRANULOCYTES NFR BLD AUTO: 0 % (ref 0–1)
LYMPHOCYTES # BLD AUTO: 1.17 K/MM3 (ref 0.84–5.2)
LYMPHOCYTES NFR BLD AUTO: 8 % (ref 21–46)
MCHC RBC AUTO-ENTMCNC: 33 G/DL (ref 31.5–36.5)
MCV RBC AUTO: 88 FL (ref 80–100)
MONOCYTES # BLD AUTO: 0.73 K/MM3 (ref 0.16–1.47)
MONOCYTES NFR BLD AUTO: 5 % (ref 4–13)
NEUTROPHILS # BLD AUTO: 11.97 K/MM3 (ref 1.96–9.15)
NEUTROPHILS NFR BLD AUTO: 85 % (ref 41–73)
NRBC # BLD AUTO: 0 K/MM3 (ref 0–0.02)
NRBC BLD AUTO-RTO: 0 /100 WBC (ref 0–0.2)
PLATELET # BLD AUTO: 204 K/MM3 (ref 150–400)
POTASSIUM SERPL-SCNC: 4.3 MMOL/L (ref 3.5–5.5)
PROT SERPL-MCNC: 8.2 G/DL (ref 6.4–8.2)
PROTHROMBIN TIME: 11.2 SEC (ref 9.7–11.5)
RSV RNA SPEC QL NAA+PROBE: NEGATIVE
SARS-COV-2 RNA RESP QL NAA+PROBE: NEGATIVE
SODIUM SERPL-SCNC: 139 MMOL/L (ref 136–145)

## 2022-10-26 PROCEDURE — P9047 ALBUMIN (HUMAN), 25%, 50ML: HCPCS

## 2022-10-26 PROCEDURE — B2131ZZ FLUOROSCOPY OF MULTIPLE CORONARY ARTERY BYPASS GRAFTS USING LOW OSMOLAR CONTRAST: ICD-10-PCS | Performed by: INTERNAL MEDICINE

## 2022-10-26 PROCEDURE — 3E03329 INTRODUCTION OF OTHER ANTI-INFECTIVE INTO PERIPHERAL VEIN, PERCUTANEOUS APPROACH: ICD-10-PCS | Performed by: HOSPITALIST

## 2022-10-26 PROCEDURE — U0004 COV-19 TEST NON-CDC HGH THRU: HCPCS

## 2022-10-26 PROCEDURE — B2181ZZ FLUOROSCOPY OF LEFT INTERNAL MAMMARY BYPASS GRAFT USING LOW OSMOLAR CONTRAST: ICD-10-PCS | Performed by: INTERNAL MEDICINE

## 2022-10-26 PROCEDURE — 4A023N7 MEASUREMENT OF CARDIAC SAMPLING AND PRESSURE, LEFT HEART, PERCUTANEOUS APPROACH: ICD-10-PCS | Performed by: INTERNAL MEDICINE

## 2022-10-26 PROCEDURE — B2111ZZ FLUOROSCOPY OF MULTIPLE CORONARY ARTERIES USING LOW OSMOLAR CONTRAST: ICD-10-PCS | Performed by: INTERNAL MEDICINE

## 2022-10-26 PROCEDURE — A9270 NON-COVERED ITEM OR SERVICE: HCPCS

## 2022-10-26 PROCEDURE — C1760 CLOSURE DEV, VASC: HCPCS

## 2022-10-26 PROCEDURE — C1894 INTRO/SHEATH, NON-LASER: HCPCS

## 2022-10-26 PROCEDURE — B2151ZZ FLUOROSCOPY OF LEFT HEART USING LOW OSMOLAR CONTRAST: ICD-10-PCS | Performed by: INTERNAL MEDICINE

## 2022-10-26 PROCEDURE — B24BZZ3 ULTRASONOGRAPHY OF HEART WITH AORTA, INTRAVASCULAR: ICD-10-PCS | Performed by: INTERNAL MEDICINE

## 2022-10-26 PROCEDURE — C1769 GUIDE WIRE: HCPCS

## 2022-10-27 LAB
ALBUMIN SERPL BCP-MCNC: 2 G/DL (ref 3.4–5)
ALBUMIN/GLOB SERPL: 0.4 {RATIO} (ref 0.8–1.8)
ALT SERPL W P-5'-P-CCNC: 20 U/L (ref 12–78)
ANION GAP SERPL CALCULATED.4IONS-SCNC: 6 MMOL/L (ref 6–16)
AST SERPL W P-5'-P-CCNC: 20 U/L (ref 12–37)
BASOPHILS # BLD AUTO: 0.02 K/MM3 (ref 0–0.23)
BASOPHILS NFR BLD AUTO: 0 % (ref 0–2)
BILIRUB SERPL-MCNC: 0.3 MG/DL (ref 0.1–1)
BUN SERPL-MCNC: 32 MG/DL (ref 8–24)
CALCIUM SERPL-MCNC: 8 MG/DL (ref 8.5–10.1)
CHLORIDE SERPL-SCNC: 106 MMOL/L (ref 98–108)
CK SERPL-CCNC: 124 U/L (ref 39–308)
CK SERPL-CCNC: 166 U/L (ref 39–308)
CO2 SERPL-SCNC: 28 MMOL/L (ref 21–32)
CREAT SERPL-MCNC: 1.23 MG/DL (ref 0.6–1.2)
DEPRECATED RDW RBC AUTO: 47.1 FL (ref 35.1–46.3)
EOSINOPHIL # BLD AUTO: 0 K/MM3 (ref 0–0.68)
EOSINOPHIL NFR BLD AUTO: 0 % (ref 0–6)
ERYTHROCYTE [DISTWIDTH] IN BLOOD BY AUTOMATED COUNT: 14.6 % (ref 11.7–14.2)
GLOBULIN SER CALC-MCNC: 4.6 G/DL (ref 2.2–4)
GLUCOSE SERPL-MCNC: 267 MG/DL (ref 70–99)
GLUCOSE UR-MCNC: (no result) MG/DL
GLUCOSE UR-MCNC: (no result) MG/DL
HCT VFR BLD AUTO: 31.9 % (ref 37–53)
HGB BLD-MCNC: 10.4 G/DL (ref 13.5–17.5)
IMM GRANULOCYTES # BLD AUTO: 0.17 K/MM3 (ref 0–0.1)
IMM GRANULOCYTES NFR BLD AUTO: 1 % (ref 0–1)
LYMPHOCYTES # BLD AUTO: 0.43 K/MM3 (ref 0.84–5.2)
LYMPHOCYTES NFR BLD AUTO: 3 % (ref 21–46)
MCHC RBC AUTO-ENTMCNC: 32.6 G/DL (ref 31.5–36.5)
MCV RBC AUTO: 89 FL (ref 80–100)
MONOCYTES # BLD AUTO: 0.54 K/MM3 (ref 0.16–1.47)
MONOCYTES NFR BLD AUTO: 4 % (ref 4–13)
NEUTROPHILS # BLD AUTO: 13.95 K/MM3 (ref 1.96–9.15)
NEUTROPHILS NFR BLD AUTO: 92 % (ref 41–73)
NRBC # BLD AUTO: 0 K/MM3 (ref 0–0.02)
NRBC BLD AUTO-RTO: 0 /100 WBC (ref 0–0.2)
PLATELET # BLD AUTO: 147 K/MM3 (ref 150–400)
POTASSIUM SERPL-SCNC: 3.8 MMOL/L (ref 3.5–5.5)
PROT SERPL-MCNC: 6.6 G/DL (ref 6.4–8.2)
PROT UR STRIP-MCNC: (no result) MG/DL
PROT UR STRIP-MCNC: (no result) MG/DL
RBC #/AREA URNS HPF: (no result) /HPF (ref 0–2)
RBC #/AREA URNS HPF: (no result) /HPF (ref 0–2)
SODIUM SERPL-SCNC: 140 MMOL/L (ref 136–145)
SP GR SPEC: 1.01 (ref 1–1.02)
SP GR SPEC: 1.01 (ref 1–1.02)
URN SPEC COLLECT METH UR: (no result)
UROBILINOGEN UR STRIP-MCNC: (no result) MG/DL
UROBILINOGEN UR STRIP-MCNC: (no result) MG/DL
WBC #/AREA URNS HPF: (no result) /HPF (ref 0–5)
WBC #/AREA URNS HPF: (no result) /HPF (ref 0–5)

## 2022-10-28 LAB
ANION GAP SERPL CALCULATED.4IONS-SCNC: 5 MMOL/L (ref 6–16)
BASOPHILS # BLD AUTO: 0.03 K/MM3 (ref 0–0.23)
BASOPHILS NFR BLD AUTO: 0 % (ref 0–2)
BUN SERPL-MCNC: 44 MG/DL (ref 8–24)
CALCIUM SERPL-MCNC: 7.7 MG/DL (ref 8.5–10.1)
CHLORIDE SERPL-SCNC: 101 MMOL/L (ref 98–108)
CO2 SERPL-SCNC: 29 MMOL/L (ref 21–32)
CREAT SERPL-MCNC: 1.65 MG/DL (ref 0.6–1.2)
DEPRECATED RDW RBC AUTO: 48.9 FL (ref 35.1–46.3)
EOSINOPHIL # BLD AUTO: 0.02 K/MM3 (ref 0–0.68)
EOSINOPHIL NFR BLD AUTO: 0 % (ref 0–6)
ERYTHROCYTE [DISTWIDTH] IN BLOOD BY AUTOMATED COUNT: 15.1 % (ref 11.7–14.2)
GLUCOSE SERPL-MCNC: 108 MG/DL (ref 70–99)
HCT VFR BLD AUTO: 25.4 % (ref 37–53)
HGB BLD-MCNC: 8.4 G/DL (ref 13.5–17.5)
IMM GRANULOCYTES # BLD AUTO: 0.07 K/MM3 (ref 0–0.1)
IMM GRANULOCYTES NFR BLD AUTO: 1 % (ref 0–1)
LYMPHOCYTES # BLD AUTO: 1.49 K/MM3 (ref 0.84–5.2)
LYMPHOCYTES NFR BLD AUTO: 13 % (ref 21–46)
MAGNESIUM SERPL-MCNC: 2 MG/DL (ref 1.6–2.4)
MCHC RBC AUTO-ENTMCNC: 33.1 G/DL (ref 31.5–36.5)
MCV RBC AUTO: 89 FL (ref 80–100)
MONOCYTES # BLD AUTO: 0.66 K/MM3 (ref 0.16–1.47)
MONOCYTES NFR BLD AUTO: 6 % (ref 4–13)
NEUTROPHILS # BLD AUTO: 8.88 K/MM3 (ref 1.96–9.15)
NEUTROPHILS NFR BLD AUTO: 80 % (ref 41–73)
NRBC # BLD AUTO: 0 K/MM3 (ref 0–0.02)
NRBC BLD AUTO-RTO: 0 /100 WBC (ref 0–0.2)
PLATELET # BLD AUTO: 107 K/MM3 (ref 150–400)
POTASSIUM SERPL-SCNC: 3.7 MMOL/L (ref 3.5–5.5)
SODIUM SERPL-SCNC: 135 MMOL/L (ref 136–145)

## 2022-10-30 LAB
ANION GAP SERPL CALCULATED.4IONS-SCNC: 6 MMOL/L (ref 6–16)
BASOPHILS # BLD AUTO: 0.04 K/MM3 (ref 0–0.23)
BASOPHILS NFR BLD AUTO: 1 % (ref 0–2)
BUN SERPL-MCNC: 50 MG/DL (ref 8–24)
CALCIUM SERPL-MCNC: 8.5 MG/DL (ref 8.5–10.1)
CHLORIDE SERPL-SCNC: 108 MMOL/L (ref 98–108)
CO2 SERPL-SCNC: 28 MMOL/L (ref 21–32)
CREAT SERPL-MCNC: 1.22 MG/DL (ref 0.6–1.2)
DEPRECATED RDW RBC AUTO: 47.8 FL (ref 35.1–46.3)
EOSINOPHIL # BLD AUTO: 0.13 K/MM3 (ref 0–0.68)
EOSINOPHIL NFR BLD AUTO: 3 % (ref 0–6)
ERYTHROCYTE [DISTWIDTH] IN BLOOD BY AUTOMATED COUNT: 14.7 % (ref 11.7–14.2)
GLUCOSE SERPL-MCNC: 130 MG/DL (ref 70–99)
HCT VFR BLD AUTO: 35.5 % (ref 37–53)
HGB BLD-MCNC: 11.3 G/DL (ref 13.5–17.5)
IMM GRANULOCYTES # BLD AUTO: 0.02 K/MM3 (ref 0–0.1)
IMM GRANULOCYTES NFR BLD AUTO: 0 % (ref 0–1)
LYMPHOCYTES # BLD AUTO: 1.89 K/MM3 (ref 0.84–5.2)
LYMPHOCYTES NFR BLD AUTO: 38 % (ref 21–46)
MCHC RBC AUTO-ENTMCNC: 31.8 G/DL (ref 31.5–36.5)
MCV RBC AUTO: 90 FL (ref 80–100)
MONOCYTES # BLD AUTO: 0.46 K/MM3 (ref 0.16–1.47)
MONOCYTES NFR BLD AUTO: 9 % (ref 4–13)
NEUTROPHILS # BLD AUTO: 2.5 K/MM3 (ref 1.96–9.15)
NEUTROPHILS NFR BLD AUTO: 50 % (ref 41–73)
NRBC # BLD AUTO: 0 K/MM3 (ref 0–0.02)
NRBC BLD AUTO-RTO: 0 /100 WBC (ref 0–0.2)
PLATELET # BLD AUTO: 145 K/MM3 (ref 150–400)
POTASSIUM SERPL-SCNC: 4.3 MMOL/L (ref 3.5–5.5)
SODIUM SERPL-SCNC: 142 MMOL/L (ref 136–145)

## 2022-11-02 LAB
ALBUMIN SERPL BCP-MCNC: 1.5 G/DL (ref 3.4–5)
ALBUMIN/GLOB SERPL: 0.3 {RATIO} (ref 0.8–1.8)
ALT SERPL W P-5'-P-CCNC: 11 U/L (ref 12–78)
ANION GAP SERPL CALCULATED.4IONS-SCNC: 3 MMOL/L (ref 6–16)
AST SERPL W P-5'-P-CCNC: 21 U/L (ref 12–37)
BILIRUB SERPL-MCNC: 0.3 MG/DL (ref 0.1–1)
BUN SERPL-MCNC: 44 MG/DL (ref 8–24)
CALCIUM SERPL-MCNC: 9.2 MG/DL (ref 8.5–10.1)
CHLORIDE SERPL-SCNC: 109 MMOL/L (ref 98–108)
CO2 SERPL-SCNC: 31 MMOL/L (ref 21–32)
CREAT SERPL-MCNC: 1.1 MG/DL (ref 0.6–1.2)
DEPRECATED RDW RBC AUTO: 47.5 FL (ref 35.1–46.3)
ERYTHROCYTE [DISTWIDTH] IN BLOOD BY AUTOMATED COUNT: 14.7 % (ref 11.7–14.2)
GLOBULIN SER CALC-MCNC: 5.1 G/DL (ref 2.2–4)
GLUCOSE SERPL-MCNC: 82 MG/DL (ref 70–99)
HCT VFR BLD AUTO: 30.7 % (ref 37–53)
HGB BLD-MCNC: 9.8 G/DL (ref 13.5–17.5)
MCHC RBC AUTO-ENTMCNC: 31.9 G/DL (ref 31.5–36.5)
MCV RBC AUTO: 90 FL (ref 80–100)
NRBC # BLD AUTO: 0 K/MM3 (ref 0–0.02)
NRBC BLD AUTO-RTO: 0 /100 WBC (ref 0–0.2)
PLATELET # BLD AUTO: 205 K/MM3 (ref 150–400)
POTASSIUM SERPL-SCNC: 4.5 MMOL/L (ref 3.5–5.5)
PROT SERPL-MCNC: 6.6 G/DL (ref 6.4–8.2)
SODIUM SERPL-SCNC: 143 MMOL/L (ref 136–145)

## 2022-11-03 LAB
ANION GAP SERPL CALCULATED.4IONS-SCNC: 5 MMOL/L (ref 6–16)
BUN SERPL-MCNC: 55 MG/DL (ref 8–24)
CALCIUM SERPL-MCNC: 9.2 MG/DL (ref 8.5–10.1)
CHLORIDE SERPL-SCNC: 104 MMOL/L (ref 98–108)
CO2 SERPL-SCNC: 32 MMOL/L (ref 21–32)
CREAT SERPL-MCNC: 1.31 MG/DL (ref 0.6–1.2)
DEPRECATED RDW RBC AUTO: 47.4 FL (ref 35.1–46.3)
ERYTHROCYTE [DISTWIDTH] IN BLOOD BY AUTOMATED COUNT: 14.8 % (ref 11.7–14.2)
FERRITIN SERPL-MCNC: 171 NG/ML (ref 26–388)
GLUCOSE SERPL-MCNC: 98 MG/DL (ref 70–99)
HCT VFR BLD AUTO: 33.7 % (ref 37–53)
HGB BLD-MCNC: 10.6 G/DL (ref 13.5–17.5)
MCHC RBC AUTO-ENTMCNC: 31.5 G/DL (ref 31.5–36.5)
MCV RBC AUTO: 89 FL (ref 80–100)
NRBC # BLD AUTO: 0 K/MM3 (ref 0–0.02)
NRBC BLD AUTO-RTO: 0 /100 WBC (ref 0–0.2)
PLATELET # BLD AUTO: 245 K/MM3 (ref 150–400)
POTASSIUM SERPL-SCNC: 4.4 MMOL/L (ref 3.5–5.5)
SODIUM SERPL-SCNC: 141 MMOL/L (ref 136–145)
TIBC SERPL-MCNC: 194 UG/DL (ref 250–450)

## 2022-11-06 LAB
ALBUMIN SERPL BCP-MCNC: 1.9 G/DL (ref 3.4–5)
ALBUMIN/GLOB SERPL: 0.3 {RATIO} (ref 0.8–1.8)
ALT SERPL W P-5'-P-CCNC: 16 U/L (ref 12–78)
ANION GAP SERPL CALCULATED.4IONS-SCNC: 3 MMOL/L (ref 6–16)
AST SERPL W P-5'-P-CCNC: 26 U/L (ref 12–37)
BILIRUB SERPL-MCNC: 0.2 MG/DL (ref 0.1–1)
BUN SERPL-MCNC: 81 MG/DL (ref 8–24)
CALCIUM SERPL-MCNC: 9 MG/DL (ref 8.5–10.1)
CHLORIDE SERPL-SCNC: 106 MMOL/L (ref 98–108)
CO2 SERPL-SCNC: 30 MMOL/L (ref 21–32)
CREAT SERPL-MCNC: 1.25 MG/DL (ref 0.6–1.2)
DEPRECATED RDW RBC AUTO: 48.8 FL (ref 35.1–46.3)
ERYTHROCYTE [DISTWIDTH] IN BLOOD BY AUTOMATED COUNT: 14.9 % (ref 11.7–14.2)
GLOBULIN SER CALC-MCNC: 5.9 G/DL (ref 2.2–4)
GLUCOSE SERPL-MCNC: 184 MG/DL (ref 70–99)
HCT VFR BLD AUTO: 36.2 % (ref 37–53)
HGB BLD-MCNC: 11.3 G/DL (ref 13.5–17.5)
MCHC RBC AUTO-ENTMCNC: 31.2 G/DL (ref 31.5–36.5)
MCV RBC AUTO: 90 FL (ref 80–100)
NRBC # BLD AUTO: 0 K/MM3 (ref 0–0.02)
NRBC BLD AUTO-RTO: 0 /100 WBC (ref 0–0.2)
PLATELET # BLD AUTO: 261 K/MM3 (ref 150–400)
POTASSIUM SERPL-SCNC: 4.6 MMOL/L (ref 3.5–5.5)
PROT SERPL-MCNC: 7.8 G/DL (ref 6.4–8.2)
SODIUM SERPL-SCNC: 139 MMOL/L (ref 136–145)

## 2022-11-08 LAB
ALBUMIN SERPL BCP-MCNC: 1.9 G/DL (ref 3.4–5)
ALBUMIN/GLOB SERPL: 0.3 {RATIO} (ref 0.8–1.8)
ALT SERPL W P-5'-P-CCNC: 17 U/L (ref 12–78)
ANION GAP SERPL CALCULATED.4IONS-SCNC: 6 MMOL/L (ref 6–16)
AST SERPL W P-5'-P-CCNC: 33 U/L (ref 12–37)
BILIRUB SERPL-MCNC: 0.3 MG/DL (ref 0.1–1)
BUN SERPL-MCNC: 89 MG/DL (ref 8–24)
CALCIUM SERPL-MCNC: 8.7 MG/DL (ref 8.5–10.1)
CHLORIDE SERPL-SCNC: 104 MMOL/L (ref 98–108)
CO2 SERPL-SCNC: 28 MMOL/L (ref 21–32)
CREAT SERPL-MCNC: 1.41 MG/DL (ref 0.6–1.2)
DEPRECATED RDW RBC AUTO: 49.9 FL (ref 35.1–46.3)
ERYTHROCYTE [DISTWIDTH] IN BLOOD BY AUTOMATED COUNT: 15.2 % (ref 11.7–14.2)
GLOBULIN SER CALC-MCNC: 5.7 G/DL (ref 2.2–4)
GLUCOSE SERPL-MCNC: 171 MG/DL (ref 70–99)
HCT VFR BLD AUTO: 34.1 % (ref 37–53)
HGB BLD-MCNC: 10.8 G/DL (ref 13.5–17.5)
MCHC RBC AUTO-ENTMCNC: 31.7 G/DL (ref 31.5–36.5)
MCV RBC AUTO: 90 FL (ref 80–100)
NRBC # BLD AUTO: 0 K/MM3 (ref 0–0.02)
NRBC BLD AUTO-RTO: 0 /100 WBC (ref 0–0.2)
PLATELET # BLD AUTO: 234 K/MM3 (ref 150–400)
POTASSIUM SERPL-SCNC: 5.2 MMOL/L (ref 3.5–5.5)
PROT SERPL-MCNC: 7.6 G/DL (ref 6.4–8.2)
SODIUM SERPL-SCNC: 138 MMOL/L (ref 136–145)

## 2022-11-10 LAB
ALBUMIN SERPL BCP-MCNC: 2 G/DL (ref 3.4–5)
ALBUMIN/GLOB SERPL: 0.4 {RATIO} (ref 0.8–1.8)
ALT SERPL W P-5'-P-CCNC: 18 U/L (ref 12–78)
ANION GAP SERPL CALCULATED.4IONS-SCNC: 5 MMOL/L (ref 6–16)
AST SERPL W P-5'-P-CCNC: 32 U/L (ref 12–37)
BILIRUB SERPL-MCNC: 0.1 MG/DL (ref 0.1–1)
BUN SERPL-MCNC: 81 MG/DL (ref 8–24)
CALCIUM SERPL-MCNC: 9.3 MG/DL (ref 8.5–10.1)
CHLORIDE SERPL-SCNC: 103 MMOL/L (ref 98–108)
CO2 SERPL-SCNC: 30 MMOL/L (ref 21–32)
CREAT SERPL-MCNC: 1.17 MG/DL (ref 0.6–1.2)
DEPRECATED RDW RBC AUTO: 49.6 FL (ref 35.1–46.3)
ERYTHROCYTE [DISTWIDTH] IN BLOOD BY AUTOMATED COUNT: 15.1 % (ref 11.7–14.2)
GLOBULIN SER CALC-MCNC: 5.7 G/DL (ref 2.2–4)
GLUCOSE SERPL-MCNC: 169 MG/DL (ref 70–99)
HCT VFR BLD AUTO: 35.2 % (ref 37–53)
HGB BLD-MCNC: 11 G/DL (ref 13.5–17.5)
MCHC RBC AUTO-ENTMCNC: 31.3 G/DL (ref 31.5–36.5)
MCV RBC AUTO: 90 FL (ref 80–100)
NRBC # BLD AUTO: 0 K/MM3 (ref 0–0.02)
NRBC BLD AUTO-RTO: 0 /100 WBC (ref 0–0.2)
PLATELET # BLD AUTO: 236 K/MM3 (ref 150–400)
POTASSIUM SERPL-SCNC: 5.1 MMOL/L (ref 3.5–5.5)
PROT SERPL-MCNC: 7.7 G/DL (ref 6.4–8.2)
SODIUM SERPL-SCNC: 138 MMOL/L (ref 136–145)

## 2022-11-16 LAB — SARS-COV-2 RNA RESP QL NAA+PROBE: NEGATIVE

## 2023-03-19 ENCOUNTER — HOSPITAL ENCOUNTER (EMERGENCY)
Dept: HOSPITAL 95 - ER | Age: 58
Discharge: HOME | End: 2023-03-19
Payer: MEDICARE

## 2023-03-19 VITALS — WEIGHT: 209 LBS | BODY MASS INDEX: 32.8 KG/M2 | HEIGHT: 67 IN

## 2023-03-19 DIAGNOSIS — E11.9: ICD-10-CM

## 2023-03-19 DIAGNOSIS — Z88.6: ICD-10-CM

## 2023-03-19 DIAGNOSIS — I25.10: ICD-10-CM

## 2023-03-19 DIAGNOSIS — J44.9: ICD-10-CM

## 2023-03-19 DIAGNOSIS — I11.0: ICD-10-CM

## 2023-03-19 DIAGNOSIS — Z95.5: ICD-10-CM

## 2023-03-19 DIAGNOSIS — Z79.82: ICD-10-CM

## 2023-03-19 DIAGNOSIS — I50.30: ICD-10-CM

## 2023-03-19 DIAGNOSIS — Z79.4: ICD-10-CM

## 2023-03-19 DIAGNOSIS — Z79.899: ICD-10-CM

## 2023-03-19 DIAGNOSIS — Z88.5: ICD-10-CM

## 2023-03-19 DIAGNOSIS — N40.0: ICD-10-CM

## 2023-03-19 DIAGNOSIS — F17.290: ICD-10-CM

## 2023-03-19 DIAGNOSIS — Z95.1: ICD-10-CM

## 2023-03-19 DIAGNOSIS — J34.0: Primary | ICD-10-CM
